# Patient Record
Sex: FEMALE | Race: WHITE | NOT HISPANIC OR LATINO | Employment: PART TIME | URBAN - METROPOLITAN AREA
[De-identification: names, ages, dates, MRNs, and addresses within clinical notes are randomized per-mention and may not be internally consistent; named-entity substitution may affect disease eponyms.]

---

## 2021-04-23 ENCOUNTER — TRANSCRIBE ORDERS (OUTPATIENT)
Dept: ADMINISTRATIVE | Facility: HOSPITAL | Age: 51
End: 2021-04-23

## 2021-04-23 DIAGNOSIS — G56.03 CARPAL TUNNEL SYNDROME, BILATERAL UPPER LIMBS: Primary | ICD-10-CM

## 2021-07-06 ENCOUNTER — PROCEDURE VISIT (OUTPATIENT)
Dept: NEUROLOGY | Facility: CLINIC | Age: 51
End: 2021-07-06
Payer: COMMERCIAL

## 2021-07-06 DIAGNOSIS — G56.03 CARPAL TUNNEL SYNDROME, BILATERAL UPPER LIMBS: ICD-10-CM

## 2021-07-06 PROCEDURE — 95886 MUSC TEST DONE W/N TEST COMP: CPT | Performed by: PHYSICAL MEDICINE & REHABILITATION

## 2021-07-06 PROCEDURE — 95911 NRV CNDJ TEST 9-10 STUDIES: CPT | Performed by: PHYSICAL MEDICINE & REHABILITATION

## 2021-07-06 NOTE — PROGRESS NOTES
EMG 2 Limb Upper Extremity     Date/Time 7/6/2021 11:17 AM     Performed by  Mary Stevenson MD     Authorized by Tomás Larios MD      [facility] Protocol   Consent: Verbal consent obtained  Risks and benefits: risks, benefits and alternatives were discussed  Consent given by: patient  Patient understanding: patient states understanding of the procedure being performed  Patient consent: the patient's understanding of the procedure matches consent given               EMG REPORT     60-year-old right-handed female with recent history of right breast lumpectomy in 2019 presents with complaints of tingling and numbness that started while she was on chemotherapy with Taxol last year  Symptoms are worse on the right  She denies any radicular symptoms  The left   median motor terminal latency was prolonged at 4 3 milliseconds with a low compound motor action potential amplitude of 3 4 mV and a normal conduction velocity across the wrist   The right median and bilateral ulnar motor conduction velocities and compound muscle action potentials were normal with normal distal latencies across the wrists  The left  median sensory peak latency was prolonged at 4 2 milliseconds with a normal sensory action potential amplitude  The right median sensory peak latency was prolonged at 3 8 milliseconds with normal sensory action potential amplitude  The bilateral radial and ulnar sensory conduction velocity and sensory action potentials were also normal with normal distal latencies across the wrists  The left and right median and ulnar F wave latencies were within normal limits  Concentric needle EMG  was performed on various proximal and distal muscles of the left upper extremity including deltoid, biceps, triceps, pronator teres, abductor pollicis brevis, FDI and low cervical paraspinals  Patient refused needle examination of the right upper extremity due to her recent right breast lumpectomy  There was no evidence of active denervation in any of the muscles tested  Moderate decreased recruitment of giant motor units was noted on the left abductor pollicis brevis   Early recruited motor units appear normal with recruitment patterns being full or full for effort in the remaining muscles tested  INTERPRETATION: There   is electrophysiologic evidence of a :    1  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

## 2021-07-13 ENCOUNTER — TELEPHONE (OUTPATIENT)
Dept: NEUROLOGY | Facility: CLINIC | Age: 51
End: 2021-07-13

## 2021-07-13 NOTE — TELEPHONE ENCOUNTER
Patient requested her EMG be faxed to PCP and Dr Clarence Tracey    Faxed to PCP @ [phone] and Dr Clarence Tracey @ [phone]

## 2021-07-15 NOTE — TELEPHONE ENCOUNTER
Pt left VM that neither fax was received by her providers  Refaxed to both pcp and neurologist  Both sent successfully